# Patient Record
Sex: FEMALE | Race: WHITE | Employment: FULL TIME | URBAN - METROPOLITAN AREA
[De-identification: names, ages, dates, MRNs, and addresses within clinical notes are randomized per-mention and may not be internally consistent; named-entity substitution may affect disease eponyms.]

---

## 2023-08-18 ENCOUNTER — OFFICE VISIT (OUTPATIENT)
Dept: FAMILY MEDICINE CLINIC | Facility: CLINIC | Age: 38
End: 2023-08-18
Payer: COMMERCIAL

## 2023-08-18 ENCOUNTER — LAB ENCOUNTER (OUTPATIENT)
Dept: LAB | Age: 38
End: 2023-08-18
Attending: FAMILY MEDICINE
Payer: COMMERCIAL

## 2023-08-18 VITALS
WEIGHT: 242 LBS | HEART RATE: 78 BPM | HEIGHT: 65 IN | TEMPERATURE: 98 F | DIASTOLIC BLOOD PRESSURE: 78 MMHG | RESPIRATION RATE: 18 BRPM | BODY MASS INDEX: 40.32 KG/M2 | OXYGEN SATURATION: 99 % | SYSTOLIC BLOOD PRESSURE: 128 MMHG

## 2023-08-18 DIAGNOSIS — F41.9 ANXIETY AND DEPRESSION: ICD-10-CM

## 2023-08-18 DIAGNOSIS — Z00.00 LABORATORY EXAM ORDERED AS PART OF ROUTINE GENERAL MEDICAL EXAMINATION: ICD-10-CM

## 2023-08-18 DIAGNOSIS — Z13.21 ENCOUNTER FOR VITAMIN DEFICIENCY SCREENING: ICD-10-CM

## 2023-08-18 DIAGNOSIS — E66.01 CLASS 3 SEVERE OBESITY DUE TO EXCESS CALORIES WITHOUT SERIOUS COMORBIDITY WITH BODY MASS INDEX (BMI) OF 40.0 TO 44.9 IN ADULT (HCC): ICD-10-CM

## 2023-08-18 DIAGNOSIS — Z00.00 WELLNESS EXAMINATION: ICD-10-CM

## 2023-08-18 DIAGNOSIS — E66.01 MORBID OBESITY (HCC): Primary | ICD-10-CM

## 2023-08-18 DIAGNOSIS — F32.A ANXIETY AND DEPRESSION: ICD-10-CM

## 2023-08-18 DIAGNOSIS — Z00.00 WELLNESS EXAMINATION: Primary | ICD-10-CM

## 2023-08-18 PROBLEM — E66.813 CLASS 3 SEVERE OBESITY DUE TO EXCESS CALORIES WITHOUT SERIOUS COMORBIDITY WITH BODY MASS INDEX (BMI) OF 40.0 TO 44.9 IN ADULT (HCC): Status: ACTIVE | Noted: 2023-08-18

## 2023-08-18 PROBLEM — E66.813 CLASS 3 SEVERE OBESITY DUE TO EXCESS CALORIES WITHOUT SERIOUS COMORBIDITY WITH BODY MASS INDEX (BMI) OF 40.0 TO 44.9 IN ADULT: Status: ACTIVE | Noted: 2023-08-18

## 2023-08-18 LAB
ALBUMIN SERPL-MCNC: 4.1 G/DL (ref 3.4–5)
ALBUMIN/GLOB SERPL: 1.1 {RATIO} (ref 1–2)
ALP LIVER SERPL-CCNC: 66 U/L
ALT SERPL-CCNC: 24 U/L
ANION GAP SERPL CALC-SCNC: 2 MMOL/L (ref 0–18)
AST SERPL-CCNC: 9 U/L (ref 15–37)
BASOPHILS # BLD AUTO: 0.05 X10(3) UL (ref 0–0.2)
BASOPHILS NFR BLD AUTO: 0.7 %
BILIRUB SERPL-MCNC: 1.5 MG/DL (ref 0.1–2)
BUN BLD-MCNC: 12 MG/DL (ref 7–18)
CALCIUM BLD-MCNC: 9 MG/DL (ref 8.5–10.1)
CHLORIDE SERPL-SCNC: 107 MMOL/L (ref 98–112)
CHOLEST SERPL-MCNC: 154 MG/DL (ref ?–200)
CO2 SERPL-SCNC: 28 MMOL/L (ref 21–32)
CREAT BLD-MCNC: 0.97 MG/DL
EGFRCR SERPLBLD CKD-EPI 2021: 76 ML/MIN/1.73M2 (ref 60–?)
EOSINOPHIL # BLD AUTO: 0.08 X10(3) UL (ref 0–0.7)
EOSINOPHIL NFR BLD AUTO: 1.1 %
ERYTHROCYTE [DISTWIDTH] IN BLOOD BY AUTOMATED COUNT: 12.3 %
EST. AVERAGE GLUCOSE BLD GHB EST-MCNC: 105 MG/DL (ref 68–126)
FASTING PATIENT LIPID ANSWER: YES
FASTING STATUS PATIENT QL REPORTED: YES
GLOBULIN PLAS-MCNC: 3.8 G/DL (ref 2.8–4.4)
GLUCOSE BLD-MCNC: 90 MG/DL (ref 70–99)
HBA1C MFR BLD: 5.3 % (ref ?–5.7)
HCT VFR BLD AUTO: 41.9 %
HCV AB SERPL QL IA: NONREACTIVE
HDLC SERPL-MCNC: 39 MG/DL (ref 40–59)
HGB BLD-MCNC: 13.5 G/DL
IMM GRANULOCYTES # BLD AUTO: 0.02 X10(3) UL (ref 0–1)
IMM GRANULOCYTES NFR BLD: 0.3 %
LDLC SERPL CALC-MCNC: 100 MG/DL (ref ?–100)
LYMPHOCYTES # BLD AUTO: 2.52 X10(3) UL (ref 1–4)
LYMPHOCYTES NFR BLD AUTO: 34.4 %
MCH RBC QN AUTO: 29.3 PG (ref 26–34)
MCHC RBC AUTO-ENTMCNC: 32.2 G/DL (ref 31–37)
MCV RBC AUTO: 90.9 FL
MONOCYTES # BLD AUTO: 0.45 X10(3) UL (ref 0.1–1)
MONOCYTES NFR BLD AUTO: 6.1 %
NEUTROPHILS # BLD AUTO: 4.2 X10 (3) UL (ref 1.5–7.7)
NEUTROPHILS # BLD AUTO: 4.2 X10(3) UL (ref 1.5–7.7)
NEUTROPHILS NFR BLD AUTO: 57.4 %
NONHDLC SERPL-MCNC: 115 MG/DL (ref ?–130)
OSMOLALITY SERPL CALC.SUM OF ELEC: 283 MOSM/KG (ref 275–295)
PLATELET # BLD AUTO: 253 10(3)UL (ref 150–450)
POTASSIUM SERPL-SCNC: 3.9 MMOL/L (ref 3.5–5.1)
PROT SERPL-MCNC: 7.9 G/DL (ref 6.4–8.2)
RBC # BLD AUTO: 4.61 X10(6)UL
SODIUM SERPL-SCNC: 137 MMOL/L (ref 136–145)
TRIGL SERPL-MCNC: 78 MG/DL (ref 30–149)
TSI SER-ACNC: 1.85 MIU/ML (ref 0.36–3.74)
VIT D+METAB SERPL-MCNC: 21 NG/ML (ref 30–100)
VLDLC SERPL CALC-MCNC: 13 MG/DL (ref 0–30)
WBC # BLD AUTO: 7.3 X10(3) UL (ref 4–11)

## 2023-08-18 PROCEDURE — 80061 LIPID PANEL: CPT | Performed by: FAMILY MEDICINE

## 2023-08-18 PROCEDURE — 86803 HEPATITIS C AB TEST: CPT | Performed by: FAMILY MEDICINE

## 2023-08-18 PROCEDURE — 83036 HEMOGLOBIN GLYCOSYLATED A1C: CPT | Performed by: FAMILY MEDICINE

## 2023-08-18 PROCEDURE — 3078F DIAST BP <80 MM HG: CPT | Performed by: FAMILY MEDICINE

## 2023-08-18 PROCEDURE — 80050 GENERAL HEALTH PANEL: CPT | Performed by: FAMILY MEDICINE

## 2023-08-18 PROCEDURE — 99203 OFFICE O/P NEW LOW 30 MIN: CPT | Performed by: FAMILY MEDICINE

## 2023-08-18 PROCEDURE — 99385 PREV VISIT NEW AGE 18-39: CPT | Performed by: FAMILY MEDICINE

## 2023-08-18 PROCEDURE — 3074F SYST BP LT 130 MM HG: CPT | Performed by: FAMILY MEDICINE

## 2023-08-18 PROCEDURE — 82306 VITAMIN D 25 HYDROXY: CPT | Performed by: FAMILY MEDICINE

## 2023-08-18 PROCEDURE — 3008F BODY MASS INDEX DOCD: CPT | Performed by: FAMILY MEDICINE

## 2023-08-18 RX ORDER — OMEGA-3 FATTY ACIDS/FISH OIL 300-1000MG
CAPSULE ORAL
COMMUNITY

## 2023-08-18 RX ORDER — MAGNESIUM 30 MG
30 TABLET ORAL 2 TIMES DAILY
COMMUNITY

## 2023-08-18 NOTE — PATIENT INSTRUCTIONS
Make an appointment with your therapist. If you feel your symptoms are worsening despite seeing your therapist, then make an appointment to see me and we will discuss medications for treating your symptoms. Recommendations for exercise are 3-5 times weekly for 30-60 minutes for a minimum of 150-300 minutes. For premenopausal women and men, 1000 mg of calcium daily is recommended. For postmenopausal women, 1200 mg of calcium daily is recommended. To help the body absorb and use calcium, vitamin D 2000 international units daily is recommended. I will contact you with your test results once available.

## 2023-10-12 ENCOUNTER — TELEPHONE (OUTPATIENT)
Dept: FAMILY MEDICINE CLINIC | Facility: CLINIC | Age: 38
End: 2023-10-12

## 2023-10-12 NOTE — TELEPHONE ENCOUNTER
Received fax requesting Intermittent Leave Form to be completed. Date Received: 10/11/2023  Received By (Walk-in/Fax/Mail): Fax  Form Name: Employee's Serious Health Condition/Leave of Absence  OSKAR Completed (Yes/N/A): N/A  Request Form Completed (Yes/N/A): Yes  Location to Pick-Up Form/Faxed#: [phone]  Need Completed by Date: 10/26/2023  Payment Received (Yes/No): No    Form emailed to forms dept, original form sent through inner office mail to forms dept. Copy made and placed in  forms bin. Informed patient may take up to 5 - 7 business days for form completion.

## 2023-10-16 NOTE — TELEPHONE ENCOUNTER
Dr. Oliva Rivera,    Pt is requesting intermittent FMLA due to anxiety/depression flare ups. Pt is requesting 1-4 flare ups per month, each lasting 1 day, 1-2 appts per month, each lasting 1-4 hours and pt is also requesting for the FMLA form to state working overtime is optional for her. Do you support?     Thank you,  Jina Dupont

## 2023-10-16 NOTE — TELEPHONE ENCOUNTER
Patient calling regarding forms. Pt given Forms Dept # at [phone]. Pt voiced understanding. none

## 2023-10-16 NOTE — TELEPHONE ENCOUNTER
Type of Leave:  intermittent FMLA  Reason for Leave: Anxiety/depression  Start date of leave:10/10/23 - 5/10/24  How much time needed?:  1-4 flare ups per month, each episode lasting 1 day. 1-2 appts per month.   Pt is also requesting to have forms stating that overtime is OPTIONAL for her  Forms Due Date: 10/26/23  Was Fee and Turnaround info Given?: yes

## 2023-10-31 NOTE — TELEPHONE ENCOUNTER
Dr. Rome Laughlin     *The ACKNOWLEDGE button has been moved to the top right ribbon*    Please sign off on form if you agree to: WALTER as discussed below   (place your signature on the first page only)    -From your Inbasket, Highlight the patient and click Chart   -Double click the 57/60/1039 Forms Completion telephone encounter  -Vance Pittman down to the Media section   -Click the blue Hyperlink: WALTER Laughlin [birth year]  -Click Acknowledge located in the top right ribbon/menu   -Drag the mouse into the blank space of the document and a + sign will appear. Left click to   electronically sign the document.      Thank you,  Agustina Villafana

## 2023-10-31 NOTE — TELEPHONE ENCOUNTER
Sarah Beth Lopez is unable to open this as she gets an error message when she attempts to do so.   We tried to copy/paste the error message but it would not let us

## 2023-11-01 NOTE — TELEPHONE ENCOUNTER
FMLA forms have been completed. Sent Mychart message to pt letting her know we need a hard copy of Auth /OSKAR. Forms are for anxiety and depression so hard copy is needed.

## 2023-11-02 NOTE — TELEPHONE ENCOUNTER
FMLA forms have been faxed to [facility] fax # [phone]. Confirmation received. Comparabien.comt message sent.

## 2023-11-20 DIAGNOSIS — F32.A ANXIETY AND DEPRESSION: ICD-10-CM

## 2023-11-20 DIAGNOSIS — F41.9 ANXIETY AND DEPRESSION: ICD-10-CM

## 2023-11-21 ENCOUNTER — TELEPHONE (OUTPATIENT)
Dept: FAMILY MEDICINE CLINIC | Facility: CLINIC | Age: 38
End: 2023-11-21

## 2023-11-21 DIAGNOSIS — F41.9 ANXIETY AND DEPRESSION: ICD-10-CM

## 2023-11-21 DIAGNOSIS — F32.A ANXIETY AND DEPRESSION: ICD-10-CM

## 2023-11-21 NOTE — TELEPHONE ENCOUNTER
Rx. Zoloft 50 mg one tablet daily sent to the patient's pharmacy. Patient to make a follow up appointment with me.

## 2024-01-19 DIAGNOSIS — F41.9 ANXIETY AND DEPRESSION: ICD-10-CM

## 2024-01-19 DIAGNOSIS — F32.A ANXIETY AND DEPRESSION: ICD-10-CM

## 2024-01-22 NOTE — TELEPHONE ENCOUNTER
Requested Prescriptions     Signed Prescriptions Disp Refills    sertraline (ZOLOFT) 50 MG Oral Tab 90 tablet 0     Sig: Take 1 tablet daily (50 mg).     Authorizing Provider: SEBLE LIZAMA     Ordering User: BISI HYLTON     Refilled per protocol/OV notes

## 2024-01-24 ENCOUNTER — TELEPHONE (OUTPATIENT)
Dept: FAMILY MEDICINE CLINIC | Facility: CLINIC | Age: 39
End: 2024-01-24

## 2024-01-24 NOTE — TELEPHONE ENCOUNTER
URGENT SICK CALL    Lila Gaona  LOV: 10/6/2023     Patient experiencing congestion, body aches,runny nose,headaches. States that she has been feeling like this since Monday . Positive to COVID tested yesterday. Patient would like something to help with congestion and body aches.     Please advise.

## 2024-01-24 NOTE — TELEPHONE ENCOUNTER
Pt positive COVID. Mostly bothered by congestion and body aches. Not using anything over the counter. No hx of hypertension so I recommended Mucinex-D for the day, Nyquil at night, Tylenol/ibuprofen for pain/fever. Advised to push fluids. ER precautions reviewed. Follow up PRN.  Verbalized understanding.

## 2024-04-15 ENCOUNTER — TELEPHONE (OUTPATIENT)
Dept: FAMILY MEDICINE CLINIC | Facility: CLINIC | Age: 39
End: 2024-04-15

## 2024-04-15 NOTE — TELEPHONE ENCOUNTER
Lila Gaona    Received Form: (Walk-in/Fax/Mail): FAX  Form Name: FMLA   OSKAR Completed (Yes/N/A): N/A   Patient Request Form Completed (Yes/N/A): N/A   Location to Pick-Up Form/Faxed#: N/A   Payment Received (Yes/No): NO    Copy made and placed in  forms bin.  Form scanned and emailed.  Original form sent to Forms Dept via Inter-office mail.

## 2024-04-17 DIAGNOSIS — F32.A ANXIETY AND DEPRESSION: ICD-10-CM

## 2024-04-17 DIAGNOSIS — F41.9 ANXIETY AND DEPRESSION: ICD-10-CM

## 2024-07-15 DIAGNOSIS — F41.9 ANXIETY AND DEPRESSION: ICD-10-CM

## 2024-07-15 DIAGNOSIS — F32.A ANXIETY AND DEPRESSION: ICD-10-CM

## 2024-08-22 ENCOUNTER — HOSPITAL ENCOUNTER (OUTPATIENT)
Dept: MAMMOGRAPHY | Age: 39
Discharge: HOME | End: 2024-08-22
Attending: FAMILY MEDICINE
Payer: COMMERCIAL

## 2024-08-22 DIAGNOSIS — Z12.31 ENCOUNTER FOR SCREENING MAMMOGRAM FOR MALIGNANT NEOPLASM OF BREAST: ICD-10-CM

## 2024-08-22 PROCEDURE — 77063 BREAST TOMOSYNTHESIS BI: CPT | Performed by: FAMILY MEDICINE

## 2024-08-22 PROCEDURE — 77067 SCR MAMMO BI INCL CAD: CPT | Performed by: FAMILY MEDICINE

## 2024-10-12 DIAGNOSIS — F41.9 ANXIETY AND DEPRESSION: ICD-10-CM

## 2024-10-12 DIAGNOSIS — F32.A ANXIETY AND DEPRESSION: ICD-10-CM

## 2024-10-15 DIAGNOSIS — F32.A ANXIETY AND DEPRESSION: ICD-10-CM

## 2024-10-15 DIAGNOSIS — F41.9 ANXIETY AND DEPRESSION: ICD-10-CM

## 2024-10-28 ENCOUNTER — TELEPHONE (OUTPATIENT)
Dept: FAMILY MEDICINE CLINIC | Facility: CLINIC | Age: 39
End: 2024-10-28

## 2024-10-28 NOTE — TELEPHONE ENCOUNTER
Called patient left message to call back. Please obtain details for Family Medical Leave Act. If Family Medical Leave Act a recert patient has not seen the provider since 10/2023 an appointment will be needed.

## 2024-10-30 NOTE — LETTER
Date: 8/18/2023    Patient Name: Balbir Ocasio          To Whom it may concern: This letter has been written at the patient's request. The above patient was seen at the [facility] for treatment of a medical condition. This patient should be excused from attending work on 8/18/2023.           Sincerely,        Preeti Ta DO None

## 2024-10-30 NOTE — TELEPHONE ENCOUNTER
Called patient,Left message to call back. 2nd attempt to reach patient to obtain details. Forms placed in hold folder.

## 2024-10-30 NOTE — TELEPHONE ENCOUNTER
Patient called back. Patient states forms are a re-cert for anxiety and depression all would remain the same. Informed patient she has not seen the provider in over a year regarding the matter. Patient states she has an appointment 11/07/24. Informed patient to call our office once appointment completed. Patient verbalized understanding. Forms kept in hold folder.

## 2024-11-07 ENCOUNTER — OFFICE VISIT (OUTPATIENT)
Dept: FAMILY MEDICINE CLINIC | Facility: CLINIC | Age: 39
End: 2024-11-07
Payer: COMMERCIAL

## 2024-11-07 ENCOUNTER — TELEPHONE (OUTPATIENT)
Dept: FAMILY MEDICINE CLINIC | Facility: CLINIC | Age: 39
End: 2024-11-07

## 2024-11-07 VITALS
WEIGHT: 259 LBS | BODY MASS INDEX: 43.15 KG/M2 | HEART RATE: 85 BPM | TEMPERATURE: 98 F | SYSTOLIC BLOOD PRESSURE: 122 MMHG | OXYGEN SATURATION: 96 % | RESPIRATION RATE: 22 BRPM | HEIGHT: 65 IN | DIASTOLIC BLOOD PRESSURE: 76 MMHG

## 2024-11-07 DIAGNOSIS — E55.9 VITAMIN D INSUFFICIENCY: ICD-10-CM

## 2024-11-07 DIAGNOSIS — Z30.431 IUD CHECK UP: ICD-10-CM

## 2024-11-07 DIAGNOSIS — Z23 NEED FOR INFLUENZA VACCINATION: ICD-10-CM

## 2024-11-07 DIAGNOSIS — F41.9 ANXIETY AND DEPRESSION: ICD-10-CM

## 2024-11-07 DIAGNOSIS — Z13.1 SCREENING FOR DIABETES MELLITUS: ICD-10-CM

## 2024-11-07 DIAGNOSIS — E66.01 CLASS 3 SEVERE OBESITY DUE TO EXCESS CALORIES WITHOUT SERIOUS COMORBIDITY WITH BODY MASS INDEX (BMI) OF 40.0 TO 44.9 IN ADULT (HCC): ICD-10-CM

## 2024-11-07 DIAGNOSIS — E66.813 CLASS 3 SEVERE OBESITY DUE TO EXCESS CALORIES WITHOUT SERIOUS COMORBIDITY WITH BODY MASS INDEX (BMI) OF 40.0 TO 44.9 IN ADULT (HCC): ICD-10-CM

## 2024-11-07 DIAGNOSIS — Z00.00 WELLNESS EXAMINATION: Primary | ICD-10-CM

## 2024-11-07 DIAGNOSIS — F32.A ANXIETY AND DEPRESSION: ICD-10-CM

## 2024-11-07 DIAGNOSIS — Z00.00 LABORATORY EXAM ORDERED AS PART OF ROUTINE GENERAL MEDICAL EXAMINATION: ICD-10-CM

## 2024-11-07 NOTE — PATIENT INSTRUCTIONS
Go for to the [facility] your fasting blood tests. Do not eat or drink except for water for at least 8 hours prior to the blood tests. Do not take any vitamins or Biotin for 3 days before your blood tests.    Schedule your appointment with the gynecologist, Dr. Maxwell or one of her partners, for your pap smear and IUD check.    Schedule an appointment with the weight loss clinic.    Continue the Sertraline 50 mg one tablet daily.    Continue with your therapist.    You received the flu vaccine today. You may run a low grade fever, have mild redness or swelling at the site of the shot, muscle pain at the site of the shot for the next 2-3 days. You may take Tylenol or Ibuprofen as needed. Use your arm to help decrease pain and swelling. You can apply ice to any swelling for 10-15 minutes twice daily through clothing or a towel.    For premenopausal women and men, 1000 mg of calcium daily is recommended. For postmenopausal women, 1200 mg of calcium daily is recommended.    To help the body absorb and use calcium, vitamin D 2000 international units daily is recommended.    Recommendations for exercise are 3-5 times weekly for 30-60 minutes for a minimum of 150-300 minutes.     Call your insurance company to see if they cover any of these weight loss medications: Metformin, Topamax, Wegovy or Zepbound.    I will contact you with your test results once available.    Patient Resources:     Personal Training/Fitness Classes/Health Coaching     [facility] in [place]: Full fitness center with group fitness and personal training located in [place].  Health Coaching with Marlee Rahamn, Mitch Valerio, and Mickey Cardenas at our [facility]- individual coaching to work on your health goals. Call [phone] and/or email @ [email or web address]. Free 60 minute consult when client of [employer] [facility].  [address] [place] [address] [email or web address].[address] [place] [address].[facility]. A variety of group fitness options  plus various yoga classes [phone] and/or email Manisha at [email or web address] (PRN)  [facility]\""ed Fitness Centers with multiple locations: Cellabus (www.IngagePatient.Cybersource), F45 Training (www.v52jhjoqkir.Cybersource), Fit Body Bootcamp (www.Foomanchew.combodybootSociety of Cable Telecommunications Engineers (SCTE)p.Cybersource), [employer] (www.LX Enterprises.Cybersource), The Exercise  (www.exercisecoach.com), Club Pilates (www.clubpilaRemark Media.Cybersource)     Online Fitness  Fitness  on [employer]  Fit in 10 DVD series                              www.raryb50NLGMedical Predictive Science Corporation  Chair exercises via Sit and Be Fit (www.sitandbefit.org) and Cerephex (www.Zmqnw.com.cn) or Tunde Simms or Kush Arnett videos on YouTube.  Hip Hop Fit with Robin Nguyen at www.hiphopfit.Crimson Renewable     Apps for on the Go Fitness  Shoup 7 Minute Workout (orange box with white 7) - free on the go HIIT training khoa  Peloton Khoa @ www.onepeloton.com     Nutrition Trackers and Programs  LoseIT! And My Fitness Pal apps and on line for tracking nutrition  NOOM - virtual health coaching  FitFoundation (healthy meals on the go) in [place] @ wwwechoechoevnzdwtjhinws6pMedical Predictive Science Corporation  Adebayo QUEVEDO @ finalsitebistrdotloop and Frowfp31 (calorie smart and low carb plans recommended) @ www.bbvayt36.com, Metabolic Meals @ www.NanoDetection TechnologyMetabolicMeals.com - individual prepared meals to go  Gobble, Blue Apron, Home , Every Plate, Sunbasket- on line meal delivery programs for preparation at home  Meal Village in [place] for homemade meals to go @ www.mealvillage.Cybersource  Diet Doctor @ www.dietdoctor.com - low carb swaps  YummBattlepro - meal prep and planning khoa (Rizzoma.yummly.com)     Stress, Anxiety, Depression, Trauma  CALM meditation khoa (www.calm.com)  Headspace  Don't let anxiety run your life. Using the science of emotion regulation and mindfulness to overcome fear and worry by Walt Galaviz PsyD and Gilbert Carson MA.  The NeuroQuest Podcast (September 27, 2023): 6 Magic Words That Stop Anxiety  What Happened to You?- a look at the impact trauma  has on behavior written by Aida Baxter and Dr. Indra Parsons  Whole Again by Yung Munguia - discovering your true self after trauma     Mindful Eating/The Hungry Brain  Am I Hungry? Mindful eating virtual  reginaldo (www.amihungry.com)  The Hungry Brain by Nell Goodman, PhD  Mindless Eating by Romairo Mosqueda  Weight Loss Surgery Will Not Treat Food Addiction by Lisa Clayton Ph.D     Metabolic Dysfunction, Hormones and Cravings  Why We Get Sick? By Scooby Schroeder (insulin resistance)  Your Body in Balance: The New Science of Food, Hormones, and Health by Dr. Saleem Rios  The Complete Guide to fasting by Dr. Mahajan  Fast Like a Girl by Dr. Shyla Gamez  The Menopause Reset by Dr. Shyla Gamez  Sugar, Salt & Fat by Arlene Pfeiffer, Ph.D, R.D.  The Truth About Sugar - documentary on sugar (Free on [employer], https://Tellou.be/7B8ypzuPT2z)  Reverse Visceral Fat: #1 Way to Increase Your Lifespan & End Inflammation with Dr. Ajit Mcdonald on Utube @ https://eASIC.be/nupPRnvUpJY?si=ks4uckXfIIM7CrfJ     Nutrition Support  You Are What You Eat - Netfix series on twin study looking at impact of nutrition changes on health  The End of Dieting: How to Live for Life by Dr. Dave Hunter M.D. or listen to The Lendino Podcast Episode 63: Understanding \"Nutritarian\" Eating w/Dr. Dave Hunter  The Game Changers- Netflix Documentary on plant based nutrition  The Dr. Burns T5 Wellness Plan by Dr. Bakari Burns MD  The Complete Guide to fasting by Dr. Mahajan  @[place] campusix (Instagram Dietician with support surrounding nutrition and meal prep/planning)     Education, Motivation and Support Resources  Live to 100: Secrets of the Blue Zones - Netflix series on the secrets to communities living over 100 years old  Atomic Habits by Jose Lim (a book about taking small steps to promote greater behavior change)   Motivation reginaldo (black box with white \")- daily supportive messages sent to your phone  Can't Hurt Me by Walt Negron (a book  exploring the power of discipline in achieving your goals)  Fed Up - documentary about obesity (Free on Utube)  Www.yourweightmatters.org - Obesity Action Coalition sponsored Blog posts  Obesity Action Coalition Resources on topics specific to weight management (www.obesityaction.org)  Fitlosophy Fitspiration - journal to better health (journal book found at [employer] in fitness aisle)  Sarah Smith talk titled: The Call to Courage (Netflix)  The Exam Room by the Physician's Committee (Podcast)  Nutrition Facts by Dr. Michaels (Podcast)     Balanced Nutrition includes:      Build the mentality of Food 4 Fuel. Clean eating with whole foods and eliminating/reducing ultra processed foods.  Be an intuitive eater and using mindful eating practices.  Eat a balanced plate with protein and produce at all meals: 1/4 plate- protein, 1/2 plate non starchy veggies, and 1/4 plate fruit or complex carbohydrate.  Drink water with all meals and use a salad plate to naturally reduce portions.  Eliminate/reduce late night eating by stopping after 7pm. Allowing your body to fast for 12 hours (drink only water, tea or black coffee without any additives).                What’s for Lunch? Planning and Prepping the Basics  March 4, 2022  Posted in Blog, Nutrition  By Your Weight Matters Campaign     Whether you work from home or in an office, lunch can be a challenge. Finding time mid-day for a nutrition-packed lunch isn’t easy. Remember, lunch is a time to refuel for the second part of the day. Packing food with a lot of nutrition can make your afternoon more successful! Instead of being pulled to the nearest fast food restaurant, look for new options to add to your day.     Lunch Basics  First, start with these basic tips to eat more power-packed lunches.     Make Your Lunch Balanced  Your lunch should include protein, fruit, vegetables, dairy and whole grains. Adding all the food groups can give you maximum nutrition. Ham and cheese on a  whole wheat tortilla with yogurt, apple slices and carrot sticks can be a great meal. It provides protein, complex carbohydrates and fiber. You could also consider a peanut butter and banana sandwich on whole wheat bread with green peppers and low-fat dip, string cheese and strawberries.     Plan Ahead  There is nothing worse than not having a plan for lunch and resorting to a greasy burger with fries. Spend some prep time to get settled for the week by chopping and bagging vegetables, slicing fruit in containers, and portioning out whole grain crackers. If you prep on Sunday, you can grab and go all week. You could also consider packing the entire lunch the night before.     Cook a Little Extra  Having grilled chicken on Sunday night? [place] a couple extra chicken breasts to chop up for a salad or put inside a whole grain tortilla. Think of this often. Last night’s dinner can be tomorrow’s lunch! An extra serving of chili or leftover pasta are other great options.     Ideas to Get Started with the Main Dish     Find a thermos and have different sizes of containers on hand. This is a great way to use your leftovers!     Chicken, pork or steak with a side of barbecue sauce  Soups and chili  Last night’s dinner -stir barney or casserole (higher caloric density, choose a smaller portion like 1 cup)     [place] or Wrap: Use low carb or skip the bread all together and use a destiny lettuce leaf as your base  Whole grain bread with lunch meat  Whole grain tortilla with peanut butter and banana (or any fruit--try strawberry!)  Cheese quesadilla     Salads to Go:  Lettuce, veggies and protein (use last night’s protein)  Cold pasta salad with grilled chicken  Tuna or chicken salad     Add Sensible Sides:  [place]  Baked chips  Peppers and hummus  Low-fat yogurt  Blueberries  Whole grain crackers  Pasta salad  Dried fruit  Berries and fruit dip  Cheese cubes  Veggies and dip  Avocado slices  Cottage cheese  Last night’s  vegetables     What about Lunch Out?  On a busy day, takeout can be an option. You can still make your health a priority while grabbing takeout or eating out. Check out the menu for nutrition-packed proteins and sides. Choose salads and grilled meat and opt for smaller portions. Many restaurants are adding more and more healthy options as time goes on, so making healthy choices keeps getting easier.     Take it one step at a time on your way to a healthy lunch! Every bite counts.     For practical tips on meal prep, planning, shopping and dining please watch the Utube video presented at the [id number] conference by Obesity Action Coalition with the following link:     https://www.obesityaction.org/oac-videos/planning-shopping-and-dining-practical-tactics-for-good-nutrition/#:~:text=Planning%2C%20Shopping%20and%20Dining%3A%20Practical%20Tactics%20for%20Good%20Nutrition

## 2024-11-07 NOTE — PROGRESS NOTES
The 21st Century Cures Act makes medical notes like these available to patients in the interest of transparency. Please be advised this is a medical document. Medical documents are intended to carry relevant information, facts as evident, and the clinical opinion of the practitioner. The medical note is intended as peer to peer communication and may appear blunt or direct. It is written in medical language and may contain abbreviations or verbiage that are unfamiliar.     Lila Gaona is a 39 year old female who is here for   Chief Complaint   Patient presents with    Wellness Visit       HPI:       This 39-year-old female presents to the office for her annual wellness exam and for follow-up on her anxiety and depression.    The patient states she is taking her sertraline 50 mg daily and needs a refill.  She is seeing her therapist weekly but her therapist will be going up maternity leave so her next appointment will be in 2 months.  She feels her symptoms have been controlled with her medications.  She is asking that FMLA forms to be completed again so she can take up to 4 days a month off for her anxiety as needed.  She states she had faxed the new forms to the forms department.  They had instructed her she needed to see her doctor before they could complete it.    The patient is also asking about medications for weight loss.  She would be interested in either Wegovy or Zepbound.  She has no personal history for thyroid cancer.  There is no family history for thyroid or pancreatic cancer or MEN.  She has never been on medications for weight loss previously.  She is not a diabetic.  She would like a referral to the weight loss clinic.    She does have a previous history for vitamin D insufficiency and she is not currently taking any vitamin D supplementation.    The patient is asking for referral to a gynecologist for her Pap smear and IUD checkup.  Her last Pap smear was in 2021.    The patient would like to  get her flu shot today.    History and physical is assisted with the  via the language line.    Exercise: three times per week, walking.  Diet: watches somewhat  Sleep: 5-6 hours     Depression/Anxiety:   PHQ-2 SCORE: 0  , done 2024        Fall Risk: No falls last 3 months  Gun in home:No            Glasses/contacts: No             Recent eye doctor visit:No   Hearing aids:No    Family History for:  Breast ca-Maternal aunt  Ovarian ca-No  Uterine ca-No  Colon ca-No       FDLMP 10/27/2024, monthly, has IUD in place  Last pap smear:   Last Mammogram: 2024    Colonoscopy: Never      Past Medical History:    Anxiety    Class 3 severe obesity due to excess calories without serious comorbidity with body mass index (BMI) of 40.0 to 44.9 in adult (HCC)    Depression    Vitamin D insufficiency       Past Surgical History:   Procedure Laterality Date    Laparoscopic cholecystectomy  2010             Family History   Problem Relation Age of Onset    Hypertension Father        Social History     Socioeconomic History    Marital status:    Tobacco Use    Smoking status: Never    Smokeless tobacco: Never   Vaping Use    Vaping status: Never Used   Substance and Sexual Activity    Alcohol use: Yes     Comment: 1 once month    Drug use: Never    Sexual activity: Yes     Works as     Medications:    Medications Ordered Prior to Encounter[1]    Allergies:    Allergies[2]    REVIEW OF SYSTEMS:     ROS is negative except as stated in HPI.      EXAM:   /76   Pulse 85   Temp 98.1 °F (36.7 °C) (Temporal)   Resp 22   Ht 5' 5\" (1.651 m)   Wt 259 lb (117.5 kg)   LMP 10/27/2024 (Approximate)   SpO2 96%   BMI 43.10 kg/m²     Wt Readings from Last 3 Encounters:   24 259 lb (117.5 kg)   10/06/23 241 lb (109.3 kg)   23 242 lb (109.8 kg)       BP Readings from Last 3 Encounters:   24 122/76   10/06/23 104/70   23 128/78         Visual Acuity  Right Eye Visual Acuity: Uncorrected Right Eye Chart Acuity: 20/20   Left Eye Visual Acuity: Uncorrected Left Eye Chart Acuity: 20/20   Both Eyes Visual Acuity: Uncorrected Both Eyes Chart Acuity: 20/20   Able To Tolerate Visual Acuity: Yes      Weight is up 17 pounds from her 8/13/2023 office visit.    General: WH/morbidly obese/WD, in NAD, A and O times 3  HEAD: Normocephalic, atraumatic  EYES: GILDARDO, EOMI, conjunctiva normal, sclera anicteric.  EARS: Tympanic membranes normal, EAC's normal.  NOSE: Turbninates normal, no bleeding noted.  PHARYNX:  No eythema or exudates, mucous membrane moist, airway patent.  NECK:  No cervical lymphadenopathy or thyromegaly, no bruits noted.  HEART: Regular rate and rhythm, no S3, S4 or murmur noted.  LUNGS: Clear to ausculation. No retractions or tachypnea noted.  BREASTS: Deferred-to see GYN   ABDOMEN: Soft, obese, nontender, no guarding, rigidity or rebound, no masses or hepatosplenomegaly, normal bowel sounds in all four quadrants.  : Deferred-to see GYN   BACK: No tenderness over the thoracic or lumbar spine. No scoliosis noted.  EXTREMITIES: No clubbing, cyanosis, edema noted. Motor strength +5/5 in all 4 extremities. DTR's +2/4 in all 4 extremities. Able to toe and heel walk.  SKIN: Warm and dry.  NEURO: Cr. N. II-XII intact, normal gait  PSYCH: Normal affect and mood.      The 10-year ASCVD risk score (Moses MORGAN, et al., 2019) is: 0.6%    Values used to calculate the score:      Age: 39 years      Sex: Female      Is Non- : No      Diabetic: No      Tobacco smoker: No      Systolic Blood Pressure: 122 mmHg      Is BP treated: No      HDL Cholesterol: 39 mg/dL      Total Cholesterol: 154 mg/dL    ASSESSMENT AND PLAN:     1. Wellness examination  -We discussed the following:  Healthy diet and exercise, cancer screening, self breast exams and calcium and vitamin D supplementation.      2. Laboratory exam ordered as part of  routine general medical examination    - CBC With Differential With Platelet; Future  - Comp Metabolic Panel (14); Future  - Lipid Panel; Future  - TSH W Reflex To Free T4; Future    3. Screening for diabetes mellitus    - Hemoglobin A1C [E]; Future    4. Need for influenza vaccination    Flu shot is given today.  Side effects are reviewed.    - Fluzone trivalent vaccine, PF 0.5mL, 6mo+ (99767)    5. Anxiety and depression    Today's PHQ 2 score is 0.  The patient states her anxiety and depression have improved significantly with the sertraline 50 mg daily which I have refilled.  She is continuing with her therapist.  I told her we would check with the forms department and let them know she had been seen today and they could complete the forms with the same information with up to 4 days a month off as needed for the anxiety.    - sertraline (ZOLOFT) 50 MG Oral Tab; Take 1 tablet daily (50 mg).  Dispense: 90 tablet; Refill: 1    6. Vitamin D insufficiency    Last vitamin D level on 8/18/2023 was low at 21.0.  She is due for recheck.    - Vitamin D [E]; Future    7. Class 3 severe obesity due to excess calories without serious comorbidity with body mass index (BMI) of 40.0 to 44.9 in adult (HCC)    I reviewed the different medications which can be used for weight loss with the patient.  I also discussed potential side effects from some of these medications.  I told her she needs to check with her insurance company to see if they even cover any medications for weight loss.  I did tell her the injectables for weight loss such as Wegovy and Zepbound are very expensive.  She is given s referral to the weight loss clinic.  I encouraged her to be better about her diet and to increase her activity.    - OP REFERRAL TO WEIGHT LOSS CLINIC    8. IUD check up    The patient is referred to  for her IUD check and Pap smear.    - OBG Referral - In Network         Health Maintenance:    Health Maintenance   Topic Date Due     Annual Depression Screening  Completed    Pap Smear  02/01/2024    Annual Physical  11/07/2025    Influenza Vaccine (1) Completed    COVID-19 Vaccine (4 - 2023-24 season) 12/07/2039 (Originally 9/1/2024)    Mammogram  08/22/2025    DTaP,Tdap,and Td Vaccines (4 - Td or Tdap) 07/18/2033    Pneumococcal Vaccine: Birth to 64yrs  Aged Out         Orders This Visit:  Orders Placed This Encounter   Procedures    CBC With Differential With Platelet    Comp Metabolic Panel (14)    Lipid Panel    TSH W Reflex To Free T4    Hemoglobin A1C [E]    Vitamin D [E]    Fluzone trivalent vaccine, PF 0.5mL, 6mo+ (71092)       Meds This Visit:  Requested Prescriptions     Signed Prescriptions Disp Refills    sertraline (ZOLOFT) 50 MG Oral Tab 90 tablet 1     Sig: Take 1 tablet daily (50 mg).       Imaging & Referrals:  INFLUENZA VACCINE, TRI, PRESERV FREE, 0.5 ML  OBG - INTERNAL  OP REFERRAL TO WEIGHT LOSS CLINIC       The patient indicates understanding of these issues and agrees to the plan.  The patient is asked to return pending lab results.  Maty Corado DO    Total time: 50 minutes including precharting, H&P, plan of care.    This dictation was performed with a verbal recognition program (DRAGON) and it was checked for errors. It is possible that there are still dictated errors within this office note. If so, please bring any errors to my attention for an addendum. All efforts were made to ensure that this office note is accurate         [1]   Current Outpatient Medications on File Prior to Visit   Medication Sig Dispense Refill    magnesium 30 MG Oral Tab Take 1 tablet (30 mg total) by mouth 2 (two) times daily.      Omega 3 1000 MG Oral Cap Take by mouth.       No current facility-administered medications on file prior to visit.   [2] No Known Allergies

## 2024-11-07 NOTE — TELEPHONE ENCOUNTER
Can you please check with the forms department and see if they received a fax from the patient for a renewal on her FMLA forms? They told her she needed to see her PCP first. The FMLA forms should remain the same. There are no changes from last year.

## 2024-11-08 NOTE — TELEPHONE ENCOUNTER
Yes, forwarding to forms dept for form processing.    November 6, 2024  Jorge Luis Salazar    11/6/24 10:30 AM  Note     DUP Family Medical Leave Act form received in the forms department. Forms moved to archive.

## 2024-11-12 ENCOUNTER — LAB ENCOUNTER (OUTPATIENT)
Dept: LAB | Age: 39
End: 2024-11-12
Attending: FAMILY MEDICINE
Payer: COMMERCIAL

## 2024-11-12 DIAGNOSIS — E55.9 VITAMIN D INSUFFICIENCY: ICD-10-CM

## 2024-11-12 DIAGNOSIS — Z13.1 SCREENING FOR DIABETES MELLITUS: ICD-10-CM

## 2024-11-12 DIAGNOSIS — Z00.00 LABORATORY EXAM ORDERED AS PART OF ROUTINE GENERAL MEDICAL EXAMINATION: ICD-10-CM

## 2024-11-12 LAB
ALBUMIN SERPL-MCNC: 4.1 G/DL (ref 3.2–4.8)
ALBUMIN/GLOB SERPL: 1.1 {RATIO} (ref 1–2)
ALP LIVER SERPL-CCNC: 73 U/L
ALT SERPL-CCNC: 11 U/L
ANION GAP SERPL CALC-SCNC: 4 MMOL/L (ref 0–18)
AST SERPL-CCNC: 15 U/L (ref ?–34)
BASOPHILS # BLD AUTO: 0.06 X10(3) UL (ref 0–0.2)
BASOPHILS NFR BLD AUTO: 0.8 %
BILIRUB SERPL-MCNC: 1.2 MG/DL (ref 0.3–1.2)
BUN BLD-MCNC: 12 MG/DL (ref 9–23)
CALCIUM BLD-MCNC: 9.9 MG/DL (ref 8.7–10.4)
CHLORIDE SERPL-SCNC: 103 MMOL/L (ref 98–112)
CHOLEST SERPL-MCNC: 179 MG/DL (ref ?–200)
CO2 SERPL-SCNC: 32 MMOL/L (ref 21–32)
CREAT BLD-MCNC: 0.84 MG/DL
EGFRCR SERPLBLD CKD-EPI 2021: 90 ML/MIN/1.73M2 (ref 60–?)
EOSINOPHIL # BLD AUTO: 0.08 X10(3) UL (ref 0–0.7)
EOSINOPHIL NFR BLD AUTO: 1 %
ERYTHROCYTE [DISTWIDTH] IN BLOOD BY AUTOMATED COUNT: 12.8 %
EST. AVERAGE GLUCOSE BLD GHB EST-MCNC: 105 MG/DL (ref 68–126)
FASTING PATIENT LIPID ANSWER: YES
FASTING STATUS PATIENT QL REPORTED: YES
GLOBULIN PLAS-MCNC: 3.8 G/DL (ref 2–3.5)
GLUCOSE BLD-MCNC: 95 MG/DL (ref 70–99)
HBA1C MFR BLD: 5.3 % (ref ?–5.7)
HCT VFR BLD AUTO: 39.7 %
HDLC SERPL-MCNC: 39 MG/DL (ref 40–59)
HGB BLD-MCNC: 13.3 G/DL
IMM GRANULOCYTES # BLD AUTO: 0.02 X10(3) UL (ref 0–1)
IMM GRANULOCYTES NFR BLD: 0.3 %
LDLC SERPL CALC-MCNC: 116 MG/DL (ref ?–100)
LYMPHOCYTES # BLD AUTO: 2.35 X10(3) UL (ref 1–4)
LYMPHOCYTES NFR BLD AUTO: 30.1 %
MCH RBC QN AUTO: 29.6 PG (ref 26–34)
MCHC RBC AUTO-ENTMCNC: 33.5 G/DL (ref 31–37)
MCV RBC AUTO: 88.4 FL
MONOCYTES # BLD AUTO: 0.53 X10(3) UL (ref 0.1–1)
MONOCYTES NFR BLD AUTO: 6.8 %
NEUTROPHILS # BLD AUTO: 4.77 X10 (3) UL (ref 1.5–7.7)
NEUTROPHILS # BLD AUTO: 4.77 X10(3) UL (ref 1.5–7.7)
NEUTROPHILS NFR BLD AUTO: 61 %
NONHDLC SERPL-MCNC: 140 MG/DL (ref ?–130)
OSMOLALITY SERPL CALC.SUM OF ELEC: 288 MOSM/KG (ref 275–295)
PLATELET # BLD AUTO: 230 10(3)UL (ref 150–450)
POTASSIUM SERPL-SCNC: 3.8 MMOL/L (ref 3.5–5.1)
PROT SERPL-MCNC: 7.9 G/DL (ref 5.7–8.2)
RBC # BLD AUTO: 4.49 X10(6)UL
SODIUM SERPL-SCNC: 139 MMOL/L (ref 136–145)
TRIGL SERPL-MCNC: 131 MG/DL (ref 30–149)
TSI SER-ACNC: 2.78 UIU/ML (ref 0.55–4.78)
VIT D+METAB SERPL-MCNC: 27.7 NG/ML (ref 30–100)
VLDLC SERPL CALC-MCNC: 23 MG/DL (ref 0–30)
WBC # BLD AUTO: 7.8 X10(3) UL (ref 4–11)

## 2024-11-12 PROCEDURE — 80050 GENERAL HEALTH PANEL: CPT | Performed by: FAMILY MEDICINE

## 2024-11-12 PROCEDURE — 83036 HEMOGLOBIN GLYCOSYLATED A1C: CPT | Performed by: FAMILY MEDICINE

## 2024-11-12 PROCEDURE — 82306 VITAMIN D 25 HYDROXY: CPT | Performed by: FAMILY MEDICINE

## 2024-11-12 PROCEDURE — 80061 LIPID PANEL: CPT | Performed by: FAMILY MEDICINE

## 2024-11-13 NOTE — TELEPHONE ENCOUNTER
Dr. Corado,    Please sign off on form if you agree to:  Intermittent Family Medical Leave Act (anxiety depression), start date: 10/10/24, end date: 4/10/25     -Signature page will be the first page scanned  -From your Inbasket, Highlight the patient and click Chart   -Double click the 10/28/24 Forms Completion telephone encounter  -Scroll down to the Media section   -Click the blue Hyperlink:  Family Medical Leave Act Dr. Corado 11/13/24  -Click Acknowledge located in the top right ribbon/menu   -Drag the mouse into the blank space of the document and a + sign will appear. Left click to   electronically sign the document.  -Once signed, simply exit out of the screen and you signature will be saved.     Thank you,    Graciela

## 2024-11-13 NOTE — TELEPHONE ENCOUNTER
Patient called, requested ; ID#[id number]. Patient was instructed to call our dept after appointment with provider.  Recertification of Family Medical Leave Act, same details as 4/18/24 form    Type of Leave: Intermittent  Reason for Leave: anxiety/depression  Start date of leave: 10/10/24 - 4/10/25  End date of leave:  How many flare ups per month/length?: 1-4 flare ups/mo; each episode lasting 1-24 hr  How many appts per month/length?: 1-2 appts/mo; each lasting 1-4 hr.  Was Fee and Turnaround info Given?:

## 2024-11-18 ENCOUNTER — PATIENT MESSAGE (OUTPATIENT)
Dept: FAMILY MEDICINE CLINIC | Facility: CLINIC | Age: 39
End: 2024-11-18

## 2024-11-18 DIAGNOSIS — E66.01 CLASS 3 SEVERE OBESITY DUE TO EXCESS CALORIES WITHOUT SERIOUS COMORBIDITY WITH BODY MASS INDEX (BMI) OF 40.0 TO 44.9 IN ADULT (HCC): Primary | ICD-10-CM

## 2024-11-18 DIAGNOSIS — E66.813 CLASS 3 SEVERE OBESITY DUE TO EXCESS CALORIES WITHOUT SERIOUS COMORBIDITY WITH BODY MASS INDEX (BMI) OF 40.0 TO 44.9 IN ADULT (HCC): Primary | ICD-10-CM

## 2024-11-18 DIAGNOSIS — E78.00 ELEVATED LDL CHOLESTEROL LEVEL: ICD-10-CM

## 2024-11-19 NOTE — TELEPHONE ENCOUNTER
What medication does she want to start? We talked about Metformin, phentermine, wegovy and Zepbound.

## 2024-11-20 PROBLEM — E78.00 ELEVATED LDL CHOLESTEROL LEVEL: Status: ACTIVE | Noted: 2024-11-20

## 2024-11-20 NOTE — TELEPHONE ENCOUNTER
Prior Authorization was denied as OV notes were not sent.  Resent the medication information to [facility] at [phone] along with OV notes and copy of facesheet with BMI information

## 2024-11-20 NOTE — TELEPHONE ENCOUNTER
I sent a prescription for the Wegovy to her [facility] in [place]. She should drink 64 ounces of water daily, take 2 fiber gummies daily to avoid constipation. She may use Miralax as a stool softener if constipation occurs. She should see me in 4 weeks after starting the medication.

## 2024-12-18 ENCOUNTER — PATIENT MESSAGE (OUTPATIENT)
Dept: FAMILY MEDICINE CLINIC | Facility: CLINIC | Age: 39
End: 2024-12-18

## 2024-12-19 DIAGNOSIS — E66.01 CLASS 3 SEVERE OBESITY DUE TO EXCESS CALORIES WITHOUT SERIOUS COMORBIDITY WITH BODY MASS INDEX (BMI) OF 40.0 TO 44.9 IN ADULT (HCC): Primary | ICD-10-CM

## 2024-12-19 DIAGNOSIS — E66.813 CLASS 3 SEVERE OBESITY DUE TO EXCESS CALORIES WITHOUT SERIOUS COMORBIDITY WITH BODY MASS INDEX (BMI) OF 40.0 TO 44.9 IN ADULT (HCC): Primary | ICD-10-CM

## 2024-12-20 NOTE — TELEPHONE ENCOUNTER
Requested Prescriptions     Pending Prescriptions Disp Refills    WEGOVY 0.25 MG/0.5ML Subcutaneous Solution Auto-injector [Pharmacy Med Name: Wegovy 0.25 Mg/0.5ml Inj Lorraine] 2 mL 0     Sig: INJECT0.25MG SUBCUTANEOUSLY ONCE A WEEK       Last Refill: 11/20/24    Last OV: 11/7/24     Next OV: 2/13/25

## 2025-01-17 DIAGNOSIS — E66.813 CLASS 3 SEVERE OBESITY DUE TO EXCESS CALORIES WITHOUT SERIOUS COMORBIDITY WITH BODY MASS INDEX (BMI) OF 40.0 TO 44.9 IN ADULT (HCC): ICD-10-CM

## 2025-01-17 DIAGNOSIS — E66.01 CLASS 3 SEVERE OBESITY DUE TO EXCESS CALORIES WITHOUT SERIOUS COMORBIDITY WITH BODY MASS INDEX (BMI) OF 40.0 TO 44.9 IN ADULT (HCC): ICD-10-CM

## 2025-01-17 NOTE — TELEPHONE ENCOUNTER
Patient requesting next dose of wegovy. States having some nausea, but doing well otherwise.     Please review and advise.

## 2025-02-13 ENCOUNTER — OFFICE VISIT (OUTPATIENT)
Dept: OBGYN CLINIC | Facility: CLINIC | Age: 40
End: 2025-02-13
Payer: COMMERCIAL

## 2025-02-13 VITALS
HEIGHT: 65 IN | BODY MASS INDEX: 40.55 KG/M2 | HEART RATE: 107 BPM | SYSTOLIC BLOOD PRESSURE: 120 MMHG | DIASTOLIC BLOOD PRESSURE: 78 MMHG | WEIGHT: 243.38 LBS

## 2025-02-13 DIAGNOSIS — Z01.419 WELL WOMAN EXAM WITH ROUTINE GYNECOLOGICAL EXAM: Primary | ICD-10-CM

## 2025-02-13 DIAGNOSIS — Z12.31 ENCOUNTER FOR SCREENING MAMMOGRAM FOR MALIGNANT NEOPLASM OF BREAST: ICD-10-CM

## 2025-02-13 DIAGNOSIS — Z12.4 SCREENING FOR CERVICAL CANCER: ICD-10-CM

## 2025-02-13 PROCEDURE — 99386 PREV VISIT NEW AGE 40-64: CPT | Performed by: OBSTETRICS & GYNECOLOGY

## 2025-02-13 PROCEDURE — 3074F SYST BP LT 130 MM HG: CPT | Performed by: OBSTETRICS & GYNECOLOGY

## 2025-02-13 PROCEDURE — 87624 HPV HI-RISK TYP POOLED RSLT: CPT | Performed by: OBSTETRICS & GYNECOLOGY

## 2025-02-13 PROCEDURE — 3078F DIAST BP <80 MM HG: CPT | Performed by: OBSTETRICS & GYNECOLOGY

## 2025-02-13 PROCEDURE — 3008F BODY MASS INDEX DOCD: CPT | Performed by: OBSTETRICS & GYNECOLOGY

## 2025-02-13 RX ORDER — SEMAGLUTIDE 1 MG/.5ML
1 INJECTION, SOLUTION SUBCUTANEOUS
COMMUNITY
Start: 2025-01-17

## 2025-02-13 NOTE — PROGRESS NOTES
[facility]  Obstetrics and Gynecology  History & Physical    CC: Patient is a new patient and here for a well woman exam     Subjective:     HPI: Lila Gaona is a 39 year old  female here for a well women exam. Patient reports doing well.     OB History:  OB History    Para Term  AB Living   3 3 3 0 0 3   SAB IAB Ectopic Multiple Live Births   0 0 0 0 3       Gyne History:  Menarche: 11  Period Cycle (Days): 28 days  Period Duration (Days): 3 days  Period Flow: light  Use of Birth Control (if yes, specify type): Mirena IUD  Date When Birth Control Last Used: current  Hx Prior Abnormal Pap: No  Pap Result Notes: Pt states last pap was 5 years ago  Patient's last menstrual period was 2025 (approximate).      denies history of STDs (non-HPV).   Sexual history: Active? yes  Birth control? Mirena IUD placed 2020    Meds:  Medications Ordered Prior to Encounter[1]    All:  Allergies[2]    PMH:  Past Medical History:    Anxiety    Class 3 severe obesity due to excess calories without serious comorbidity with body mass index (BMI) of 40.0 to 44.9 in adult (HCC)    Depression    Vitamin D insufficiency       Immunization History:   Immunization History   Administered Date(s) Administered    Covid-19 Vaccine Pfizer 30 mcg/0.3 ml 2021, 2021, 2022    FLUZONE 6 months and older PFS 0.5 ml (24009) 2020, 10/06/2023    HEP B, Ped/Adol 1999, 1999    Influenza Vaccine, trivalent (IIV3), PF 0.5mL (60687) 2024    MMR 1999, 1999    OPV 1999, 1999    TDAP 2023    Td, Preserv Free 1999, 1999       PSH:  Past Surgical History:   Procedure Laterality Date          Laparoscopic cholecystectomy  2010             Social History:  Social History     Socioeconomic History    Marital status:      Spouse name: Not on file    Number of children: Not on file    Years of education: Not  on file    Highest education level: Not on file   Occupational History    Not on file   Tobacco Use    Smoking status: Never    Smokeless tobacco: Never   Vaping Use    Vaping status: Never Used   Substance and Sexual Activity    Alcohol use: Yes     Comment: 1 once month    Drug use: Never    Sexual activity: Yes     Partners: Male     Birth control/protection: I.U.D.   Other Topics Concern     Service Not Asked    Blood Transfusions Not Asked    Caffeine Concern Yes    Occupational Exposure Not Asked    Hobby Hazards Not Asked    Sleep Concern Not Asked    Stress Concern Not Asked    Weight Concern Not Asked    Special Diet Not Asked    Back Care Not Asked    Exercise Yes    Bike Helmet Not Asked    Seat Belt Yes    Self-Exams Not Asked   Social History Narrative    Not on file     Social Drivers of Health     Food Insecurity: Not on file   Transportation Needs: Not on file   Stress: Not on file   Housing Stability: Not on file         Patient feels unsafe or threatened?: denies    Abuse: denies physical, sexual or mental.     Family History:  Family History   Problem Relation Age of Onset    Hypertension Father        Health maintenance:  Mammogram (age 39 and q1-2yr): 08/2024  Impression   CONCLUSION:  No mammographic evidence of malignancy.         BI-RADS CATEGORY:    DIAGNOSTIC CATEGORY 1--NEGATIVE ASSESSMENT.       RECOMMENDATIONS:    ROUTINE MAMMOGRAM AND CLINICAL EVALUATION IN 12 MONTHS.      Colonoscopy (age 44 and q10yr): n/a    Review of Systems:  General: no complaints per category. See HPI for additional information.   Breast: no complaints per category. See HPI for additional information.   Respiratory: no complaints per category. See HPI for additional information.   Cardiovascular: no complaints per category. See HPI for additional information.   GI: no complaints per category. See HPI for additional information.   : no complaints per category. See HPI for additional information.   Heme:  no complaints per category. See HPI for additional information.       Objective:     Vitals:    25 1649   BP: 120/78   Pulse: 107   Weight: 243 lb 6 oz (110.4 kg)   Height: 65\"         Body mass index is 40.5 kg/m².    General: AAO.NAD.   CVS exam: normal peripheral perfusion  Chest: non-labored breathing, no tachypnea   Breast:  symmetric, no dominant or suspicious mass, no skin or nipple changes and no axillary adenopathy  Abdominal exam:  soft, nontender, nondistended  Pelvic exam:   VULVA: normal appearing vulva with no masses, tenderness or lesions  PERINEUM:  normal appearing, no lesions   URETHRAL MEATUS:  normal appearing, no lesions   VAGINA: normal appearing vagina with normal color and discharge, no lesions  CERVIX: normal appearing cervix without discharge or lesions  UTERUS: uterus is normal size, shape, consistency and nontender  ADNEXA: normal adnexa in size, nontender and no masses  PERIRECTAL: normal appearing, no lesions   Ext: non-tender, no edema    Assessment:     Lila Gaona is a 39 year old  female here for a well women exam.         Plan:     Problem List Items Addressed This Visit    None  Visit Diagnoses       Well woman exam with routine gynecological exam    -  Primary    Screening for cervical cancer        Relevant Orders    ThinPrep PAP with HPV Reflex Request B    ThinPrep PAP with HPV Reflex Request    Hpv Dna  High Risk , Thin Prep Collect    Encounter for screening mammogram for malignant neoplasm of breast        Relevant Orders    [facility] 2D+3D SCREENING BILAT (CPT=77067/82006)                Cervical cancer screening  - discussion held with the patient about ASCCP guidelines  - repeat pap smear today   Health maintenance  - encouraged to maintain weight at healthy BMI  - discussed importance of exercise and healthy eating  - self breast exam instructions provided  - bilateral screening mammogram recommendations discussed and order provided          All of the  findings and plan were discussed with the patient.  She notes understanding and agrees with the plan of care.  All questions were answered to the best of my ability at this time.      RTC in 1 year for a well woman exam or sooner if needed     Analilia Maxwell MD   [facility] - OBGYN      Discussed with patient that there will not be further notification of normal or benign results other than receiving results on mychart. A Digital Lumenshart message or telephone call will be placed by the physician and/or office staff if results are abnormal.     Note to patient and family   The 21st Century Cures Act makes medical notes available to patients in the interest of transparency.  However, please be advised that this is a medical document.  It is intended as pgyr-dl-ftag communication.  It is written and medical language may contain abbreviations or verbiage that are technical and unfamiliar.  It may appear blunt or direct.  Medical documents are intended to carry relevant information, facts as evident, and the clinical opinion of the practitioner.        This note could include assistance by Dragon voice recognition. Errors in content may be related to improper recognition by the system; efforts to review and correct have been done but errors may still exist.          [1]   Current Outpatient Medications on File Prior to Visit   Medication Sig Dispense Refill    WEGOVY 1 MG/0.5ML Subcutaneous Solution Auto-injector Inject 0.5 mL (1 mg total) into the skin.      sertraline (ZOLOFT) 50 MG Oral Tab Take 1 tablet daily (50 mg). 90 tablet 1    magnesium 30 MG Oral Tab Take 1 tablet (30 mg total) by mouth 2 (two) times daily. (Patient not taking: Reported on 2/13/2025)      Omega 3 1000 MG Oral Cap Take by mouth. (Patient not taking: Reported on 2/13/2025)       No current facility-administered medications on file prior to visit.   [2] No Known Allergies

## 2025-02-17 LAB — HPV E6+E7 MRNA CVX QL NAA+PROBE: NEGATIVE

## 2025-02-17 RX ORDER — SEMAGLUTIDE 1 MG/.5ML
1 INJECTION, SOLUTION SUBCUTANEOUS
Qty: 2 ML | Refills: 0 | OUTPATIENT
Start: 2025-02-17

## 2025-02-17 NOTE — TELEPHONE ENCOUNTER
Requested Prescriptions     Pending Prescriptions Disp Refills    WEGOVY 1 MG/0.5ML Subcutaneous Solution Auto-injector 4 each 0     Sig: Inject 0.5 mL (1 mg total) into the skin.       Last Refill: 1/17    Last OV: 11/7/24    Next OV: please review and advise.

## 2025-02-18 RX ORDER — SEMAGLUTIDE 1 MG/.5ML
1 INJECTION, SOLUTION SUBCUTANEOUS WEEKLY
Qty: 4 EACH | Refills: 2 | Status: SHIPPED | OUTPATIENT
Start: 2025-02-18

## 2025-03-15 DIAGNOSIS — E66.813 CLASS 3 SEVERE OBESITY DUE TO EXCESS CALORIES WITHOUT SERIOUS COMORBIDITY WITH BODY MASS INDEX (BMI) OF 40.0 TO 44.9 IN ADULT (HCC): Primary | ICD-10-CM

## 2025-03-15 DIAGNOSIS — E66.01 CLASS 3 SEVERE OBESITY DUE TO EXCESS CALORIES WITHOUT SERIOUS COMORBIDITY WITH BODY MASS INDEX (BMI) OF 40.0 TO 44.9 IN ADULT (HCC): Primary | ICD-10-CM

## 2025-03-17 RX ORDER — SEMAGLUTIDE 1 MG/.5ML
1 INJECTION, SOLUTION SUBCUTANEOUS WEEKLY
Qty: 4 EACH | Refills: 2 | OUTPATIENT
Start: 2025-03-17

## 2025-03-17 NOTE — TELEPHONE ENCOUNTER
Pt states she tolerated the 1mg wegovy well and is ready to increase to next dose.    Dose pended for approval

## 2025-04-12 DIAGNOSIS — E66.01 CLASS 3 SEVERE OBESITY DUE TO EXCESS CALORIES WITHOUT SERIOUS COMORBIDITY WITH BODY MASS INDEX (BMI) OF 40.0 TO 44.9 IN ADULT (HCC): ICD-10-CM

## 2025-04-12 DIAGNOSIS — E66.813 CLASS 3 SEVERE OBESITY DUE TO EXCESS CALORIES WITHOUT SERIOUS COMORBIDITY WITH BODY MASS INDEX (BMI) OF 40.0 TO 44.9 IN ADULT (HCC): ICD-10-CM

## 2025-04-13 DIAGNOSIS — E66.813 CLASS 3 SEVERE OBESITY DUE TO EXCESS CALORIES WITHOUT SERIOUS COMORBIDITY WITH BODY MASS INDEX (BMI) OF 40.0 TO 44.9 IN ADULT (HCC): ICD-10-CM

## 2025-04-13 DIAGNOSIS — E66.01 CLASS 3 SEVERE OBESITY DUE TO EXCESS CALORIES WITHOUT SERIOUS COMORBIDITY WITH BODY MASS INDEX (BMI) OF 40.0 TO 44.9 IN ADULT (HCC): ICD-10-CM

## 2025-04-14 RX ORDER — SEMAGLUTIDE 1.7 MG/.75ML
INJECTION, SOLUTION SUBCUTANEOUS
Qty: 4 EACH | Refills: 0 | Status: SHIPPED | OUTPATIENT
Start: 2025-04-14

## 2025-04-14 NOTE — TELEPHONE ENCOUNTER
Requested Prescriptions     Pending Prescriptions Disp Refills    WEGOVY 1.7 MG/0.75ML Subcutaneous Solution Auto-injector [Pharmacy Med Name: WEGOVY 1.7 MG/0.75 ML PEN]  0     Sig: INJECT 0.75 ML (1.7 MG TOTAL) INTO THE SKIN ONCE A WEEK FOR 28 DAYS.       Last Refill: 3/17/25    Last OV: 11/7/24    Per [facility] patient is doing well. Has some nausea.

## 2025-05-09 ENCOUNTER — TELEPHONE (OUTPATIENT)
Dept: FAMILY MEDICINE CLINIC | Facility: CLINIC | Age: 40
End: 2025-05-09

## 2025-05-10 DIAGNOSIS — E66.813 CLASS 3 SEVERE OBESITY DUE TO EXCESS CALORIES WITHOUT SERIOUS COMORBIDITY WITH BODY MASS INDEX (BMI) OF 40.0 TO 44.9 IN ADULT: ICD-10-CM

## 2025-05-11 DIAGNOSIS — F41.9 ANXIETY AND DEPRESSION: ICD-10-CM

## 2025-05-11 DIAGNOSIS — F32.A ANXIETY AND DEPRESSION: ICD-10-CM

## 2025-05-11 DIAGNOSIS — E66.813 CLASS 3 SEVERE OBESITY DUE TO EXCESS CALORIES WITHOUT SERIOUS COMORBIDITY WITH BODY MASS INDEX (BMI) OF 40.0 TO 44.9 IN ADULT: ICD-10-CM

## 2025-05-12 RX ORDER — SEMAGLUTIDE 1.7 MG/.75ML
INJECTION, SOLUTION SUBCUTANEOUS
Qty: 4 EACH | Refills: 0 | OUTPATIENT
Start: 2025-05-12

## 2025-05-12 NOTE — TELEPHONE ENCOUNTER
Requested Prescriptions     Pending Prescriptions Disp Refills    WEGOVY 1.7 MG/0.75ML Subcutaneous Solution Auto-injector [Pharmacy Med Name: WEGOVY 1.7 MG/0.75 ML PEN]  0     Sig: INJECT 0.75 ML (1.7 MG TOTAL) INTO THE SKIN ONCE A WEEK FOR 28 DAYS.       Last Refill: 4/14    Last OV: 11/7/24    Next OV:   Patient feeling good on current dose.

## 2025-05-12 NOTE — TELEPHONE ENCOUNTER
Requested Prescriptions     Pending Prescriptions Disp Refills    SERTRALINE 50 MG Oral Tab [Pharmacy Med Name: Sertraline HCl 50 MG Oral Tablet] 90 tablet 0     Sig: Take 1 tablet by mouth once daily       Last Refill: 11/7/24    Last OV: 11/7/24

## 2025-05-13 RX ORDER — SEMAGLUTIDE 1.7 MG/.75ML
INJECTION, SOLUTION SUBCUTANEOUS
Qty: 0.75 ML | Refills: 0 | Status: SHIPPED | OUTPATIENT
Start: 2025-05-13

## 2025-06-08 DIAGNOSIS — E66.813 CLASS 3 SEVERE OBESITY DUE TO EXCESS CALORIES WITHOUT SERIOUS COMORBIDITY WITH BODY MASS INDEX (BMI) OF 40.0 TO 44.9 IN ADULT: ICD-10-CM

## 2025-06-09 DIAGNOSIS — E66.813 CLASS 3 SEVERE OBESITY DUE TO EXCESS CALORIES WITHOUT SERIOUS COMORBIDITY WITH BODY MASS INDEX (BMI) OF 40.0 TO 44.9 IN ADULT: ICD-10-CM

## 2025-06-10 RX ORDER — SEMAGLUTIDE 1.7 MG/.75ML
1.7 INJECTION, SOLUTION SUBCUTANEOUS
Qty: 0.75 ML | Refills: 0 | Status: SHIPPED | OUTPATIENT
Start: 2025-06-10

## 2025-06-10 RX ORDER — SEMAGLUTIDE 1.7 MG/.75ML
INJECTION, SOLUTION SUBCUTANEOUS
Refills: 0 | OUTPATIENT
Start: 2025-06-10

## 2025-06-10 NOTE — TELEPHONE ENCOUNTER
Requested Prescriptions     Pending Prescriptions Disp Refills    semaglutide-weight management (WEGOVY) 1.7 MG/0.75ML Subcutaneous Solution Auto-injector 0.75 mL 0       Last Refill: 5/13    Last OV: 11/7/24       Patient is doing well on this medication.

## 2025-07-06 DIAGNOSIS — E66.813 CLASS 3 SEVERE OBESITY DUE TO EXCESS CALORIES WITHOUT SERIOUS COMORBIDITY WITH BODY MASS INDEX (BMI) OF 40.0 TO 44.9 IN ADULT: ICD-10-CM

## 2025-07-07 NOTE — TELEPHONE ENCOUNTER
Requested Prescriptions     Pending Prescriptions Disp Refills    semaglutide-weight management 2.4 MG/0.75ML Subcutaneous Solution Auto-injector [Pharmacy Med Name: WEGOVY 1.7 MG/0.75 ML PEN] 3 mL 0     Sig: Inject 0.75 mL (2.4 mg total) into the skin once a week for 4 doses.       Last Refill: 6/10    Last OV: 11/7/24        Patient doing well on the 1.7 mg dose. Would like increase.

## 2025-07-18 ENCOUNTER — TELEPHONE (OUTPATIENT)
Dept: FAMILY MEDICINE CLINIC | Facility: CLINIC | Age: 40
End: 2025-07-18

## 2025-08-13 DIAGNOSIS — E66.813 CLASS 3 SEVERE OBESITY DUE TO EXCESS CALORIES WITHOUT SERIOUS COMORBIDITY WITH BODY MASS INDEX (BMI) OF 40.0 TO 44.9 IN ADULT: ICD-10-CM

## 2025-08-13 RX ORDER — SEMAGLUTIDE 2.4 MG/.75ML
2.4 INJECTION, SOLUTION SUBCUTANEOUS
Qty: 4 EACH | Refills: 2 | Status: SHIPPED | OUTPATIENT
Start: 2025-08-13